# Patient Record
Sex: FEMALE | Race: BLACK OR AFRICAN AMERICAN
[De-identification: names, ages, dates, MRNs, and addresses within clinical notes are randomized per-mention and may not be internally consistent; named-entity substitution may affect disease eponyms.]

---
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
## 2020-10-12 ENCOUNTER — HOSPITAL ENCOUNTER (EMERGENCY)
Dept: HOSPITAL 62 - ER | Age: 20
LOS: 1 days | Discharge: LEFT BEFORE DISCHARGE | End: 2020-10-13
Payer: OTHER GOVERNMENT

## 2020-10-12 DIAGNOSIS — Z53.20: ICD-10-CM

## 2020-10-12 DIAGNOSIS — R07.9: ICD-10-CM

## 2020-10-12 DIAGNOSIS — R06.02: Primary | ICD-10-CM

## 2020-10-12 DIAGNOSIS — R00.0: ICD-10-CM

## 2020-10-12 LAB
APPEARANCE UR: CLEAR
APTT PPP: YELLOW S
BILIRUB UR QL STRIP: NEGATIVE
GLUCOSE UR STRIP-MCNC: NEGATIVE MG/DL
KETONES UR STRIP-MCNC: NEGATIVE MG/DL
PH UR STRIP: 7 [PH] (ref 5–9)
PROT UR STRIP-MCNC: 30 MG/DL
SP GR UR STRIP: 1.02
UROBILINOGEN UR-MCNC: 4 MG/DL (ref ?–2)

## 2020-10-12 PROCEDURE — 81001 URINALYSIS AUTO W/SCOPE: CPT

## 2020-10-12 PROCEDURE — 99281 EMR DPT VST MAYX REQ PHY/QHP: CPT

## 2020-10-12 PROCEDURE — 93005 ELECTROCARDIOGRAM TRACING: CPT

## 2020-10-12 PROCEDURE — 71045 X-RAY EXAM CHEST 1 VIEW: CPT

## 2020-10-12 PROCEDURE — 93010 ELECTROCARDIOGRAM REPORT: CPT

## 2020-10-12 PROCEDURE — 87086 URINE CULTURE/COLONY COUNT: CPT

## 2020-10-12 NOTE — RADIOLOGY REPORT (SQ)
EXAM DESCRIPTION: 



XR CHEST 1 VIEW



COMPLETED DATE/TME:  10/12/2020 20:58



CLINICAL HISTORY: 



20 years, Female, cp/sob EXAM DESCRIPTION: 







CLINICAL HISTORY: 



cp/sob



COMPARISON: 



None.



FINDINGS: 



Single view of the chest is submitted. Density at the left

lateral midlung may be a granuloma and suggests old granulomatous

disease. Its density suggests that it is calcified. Low lung

volumes limit detail.



Cardiac silhouette is normal.



No additional focal parenchymal or pleural disease.



 No acute bony abnormality.



 There is no significant pulmonary vascular engorgement.



IMPRESSION: 



No evidence of acute cardiopulmonary disease.

## 2020-10-13 VITALS — SYSTOLIC BLOOD PRESSURE: 133 MMHG | DIASTOLIC BLOOD PRESSURE: 81 MMHG

## 2020-10-13 NOTE — ER DOCUMENT REPORT
ED Medical Screen (RME)





- General


Chief Complaint: Shortness Of Breath


Stated Complaint: SHORTNESS OF BREATH / CHEST PAIN


Time Seen by Provider: 10/12/20 20:54


Mode of Arrival: Ambulatory


Information source: Patient


Notes: 





20-year-old -American female with left-sided chest pain and shortness of 

breath.  No fevers or chills.





General exam no acute distress


Cardiac slightly tachycardic


Pulmonary clear to auscultation normal 


abdomen nontender








I have greeted and performed a rapid initial assessment of this patient.  A 

comprehensive ED assessment and evaluation of the patient, analysis of test 

results and completion of the medical decision making process will be conducted 

by additional ED providers.





TRAVEL OUTSIDE OF THE [place] IN LAST 30 DAYS: No





- Related Data


Allergies/Adverse Reactions: 


                                        





No Known Allergies Allergy (Unverified 09/24/19 17:54)


   











Past Medical History


Renal/ Medical History: Denies: Hx Peritoneal Dialysis





Physical Exam





- Vital signs


Vitals: 





                                        











Temp Pulse Resp BP Pulse Ox


 


 99.2 F   117 H  20   136/85 H  96 


 


 10/12/20 19:51  10/12/20 19:51  10/12/20 19:51  10/12/20 19:51  10/12/20 19:51














Course





- Vital Signs


Vital signs: 





                                        











Temp Pulse Resp BP Pulse Ox


 


 98.5 F   106 H  18   133/81 H  96 


 


 10/13/20 00:33  10/13/20 00:33  10/13/20 00:33  10/13/20 00:33  10/13/20 00:33














- Laboratory


Laboratory results interpreted by me: 





                                        











  10/12/20





  21:37


 


Urine Protein  30 H


 


Urine Urobilinogen  4.0 H


 


Leukocyte Esterase Rfl  TRACE H

## 2020-10-13 NOTE — EKG REPORT
SEVERITY:- BORDERLINE ECG -

SINUS TACHYCARDIA

BORDERLINE T WAVE ABNORMALITIES

:

Confirmed by: Joann Lopez 13-Oct-2020 19:51:58